# Patient Record
Sex: MALE | Race: WHITE | URBAN - METROPOLITAN AREA
[De-identification: names, ages, dates, MRNs, and addresses within clinical notes are randomized per-mention and may not be internally consistent; named-entity substitution may affect disease eponyms.]

---

## 2020-07-31 ENCOUNTER — OFFICE VISIT (OUTPATIENT)
Dept: URBAN - METROPOLITAN AREA CLINIC 96 | Facility: CLINIC | Age: 50
End: 2020-07-31
Payer: COMMERCIAL

## 2020-07-31 ENCOUNTER — WEB ENCOUNTER (OUTPATIENT)
Dept: URBAN - METROPOLITAN AREA CLINIC 96 | Facility: CLINIC | Age: 50
End: 2020-07-31

## 2020-07-31 DIAGNOSIS — E66.3 OVERWEIGHT: ICD-10-CM

## 2020-07-31 DIAGNOSIS — K21.9 ACID REFLUX: ICD-10-CM

## 2020-07-31 PROCEDURE — 99243 OFF/OP CNSLTJ NEW/EST LOW 30: CPT | Performed by: INTERNAL MEDICINE

## 2020-07-31 PROCEDURE — 99203 OFFICE O/P NEW LOW 30 MIN: CPT | Performed by: INTERNAL MEDICINE

## 2020-07-31 RX ORDER — PANTOPRAZOLE SODIUM 20 MG/1
2 TABS PO BID X 10 DAYS; THEN, 1 TAB PO BID X 10 DAYS; THEN 1 TAB DAILY TABLET, DELAYED RELEASE ORAL ONCE A DAY
Qty: 70 | Refills: 0 | OUTPATIENT
Start: 2020-07-31

## 2020-07-31 NOTE — HPI-TODAY'S VISIT:
50 y.o. WM, , 2 children, works in finance PCP - Bernardo Monique Have had acid reflux for a while now Wants to get it checked Wonders if getting worse Had to change diet a little Eats earlier in day so don't get syx at night Gets mucus in eso Not taking any medicines Don't want a band-aide Wonders if something else causing it Takes a lot of Tums, if gets bad No dysphagia No N/V No wt loss

## 2020-09-11 ENCOUNTER — OFFICE VISIT (OUTPATIENT)
Dept: URBAN - METROPOLITAN AREA CLINIC 96 | Facility: CLINIC | Age: 50
End: 2020-09-11
Payer: COMMERCIAL

## 2020-09-11 ENCOUNTER — DASHBOARD ENCOUNTERS (OUTPATIENT)
Age: 50
End: 2020-09-11

## 2020-09-11 ENCOUNTER — OFFICE VISIT (OUTPATIENT)
Dept: URBAN - METROPOLITAN AREA TELEHEALTH 2 | Facility: TELEHEALTH | Age: 50
End: 2020-09-11

## 2020-09-11 VITALS
DIASTOLIC BLOOD PRESSURE: 74 MMHG | HEIGHT: 71 IN | BODY MASS INDEX: 30.52 KG/M2 | HEART RATE: 73 BPM | SYSTOLIC BLOOD PRESSURE: 121 MMHG | TEMPERATURE: 97.9 F | WEIGHT: 218 LBS

## 2020-09-11 DIAGNOSIS — R12 HEARTBURN: ICD-10-CM

## 2020-09-11 PROCEDURE — 99213 OFFICE O/P EST LOW 20 MIN: CPT | Performed by: INTERNAL MEDICINE

## 2020-09-11 RX ORDER — PANTOPRAZOLE SODIUM 20 MG/1
2 TABS PO BID X 10 DAYS; THEN, 1 TAB PO BID X 10 DAYS; THEN 1 TAB DAILY TABLET, DELAYED RELEASE ORAL ONCE A DAY
Qty: 70 | Refills: 0 | Status: DISCONTINUED | COMMUNITY
Start: 2020-07-31

## 2020-09-11 NOTE — HPI-OTHER HISTORIES
50 y.o. WM Feels great Completed medication taper Been off it 1-2 weeks Been great No dysphagia Maybe 1 episode when ate a lot of garlic w/ slight heartburn Been perfect otherwise, even w/ eating late at night and eating bad foods Even eating things that previous were triggers